# Patient Record
Sex: FEMALE | Race: WHITE | NOT HISPANIC OR LATINO | Employment: STUDENT | URBAN - METROPOLITAN AREA
[De-identification: names, ages, dates, MRNs, and addresses within clinical notes are randomized per-mention and may not be internally consistent; named-entity substitution may affect disease eponyms.]

---
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
## 2023-06-23 DIAGNOSIS — Z30.011 ENCOUNTER FOR INITIAL PRESCRIPTION OF CONTRACEPTIVE PILLS: ICD-10-CM

## 2023-06-23 RX ORDER — LEVONORGESTREL/ETHIN.ESTRADIOL 0.1-0.02MG
1 TABLET ORAL DAILY
Qty: 84 TABLET | Refills: 0 | Status: SHIPPED | OUTPATIENT
Start: 2023-06-23 | End: 2023-12-19

## 2023-11-25 ENCOUNTER — HEALTH MAINTENANCE LETTER (OUTPATIENT)
Age: 18
End: 2023-11-25

## 2023-12-18 DIAGNOSIS — Z30.011 ENCOUNTER FOR INITIAL PRESCRIPTION OF CONTRACEPTIVE PILLS: ICD-10-CM

## 2023-12-18 NOTE — LETTER
December 27, 2023      David Sandhu  [address] [address]        We received a refill request from your pharmacy for your LESSINA-28 0.1-20 MG-MCG tablet medication.  At this time the nurses were able to give you a david refill, but you are due to be seen for an office visit before the next refill.  This appointment can be scheduled by calling [phone] or can be scheduled via [employer] as well.    Thank you for choosing [facility]            Sincerely,          Melody Gomez, [facility]-BC

## 2023-12-19 RX ORDER — LEVONORGESTREL AND ETHINYL ESTRADIOL 0.1-0.02MG
KIT ORAL
Qty: 84 TABLET | Refills: 0 | Status: SHIPPED | OUTPATIENT
Start: 2023-12-19 | End: 2024-03-07

## 2024-03-06 SDOH — HEALTH STABILITY: PHYSICAL HEALTH: ON AVERAGE, HOW MANY DAYS PER WEEK DO YOU ENGAGE IN MODERATE TO STRENUOUS EXERCISE (LIKE A BRISK WALK)?: 3 DAYS

## 2024-03-06 SDOH — HEALTH STABILITY: PHYSICAL HEALTH: ON AVERAGE, HOW MANY MINUTES DO YOU ENGAGE IN EXERCISE AT THIS LEVEL?: 30 MIN

## 2024-03-06 ASSESSMENT — SOCIAL DETERMINANTS OF HEALTH (SDOH): HOW OFTEN DO YOU GET TOGETHER WITH FRIENDS OR RELATIVES?: THREE TIMES A WEEK

## 2024-03-06 NOTE — COMMUNITY RESOURCES LIST (ENGLISH)
03/06/2024   [facility]  N/A  For questions about this resource list or additional care needs, please contact your primary care clinic or care manager.  Phone: [phone]   Email: N/A   Address: [facility] [place] [address]   Hours: N/A        Hotlines and Helplines       Hotline - Housing crisis  1  [facility] (Main Office) Distance: 15.22 miles      Phone/Virtual   [address] [place] [address]  Language: English  Hours: Mon - Sun Open 24 Hours   Phone: (533) 151-5681 Email: info@[employer].Need Website: http://[employer].Need     2  [facility] Distance: 19.68 miles      Phone/Virtual   [address] [place] [address]  Language: English  Hours: Mon - Sun Open 24 Hours   Phone: (315) 448-3889 Email: info@[employer].Need Website: http://www.[employer].Need          Housing       Coordinated Entry access point  3  [facility] [place] ([facility] - Housing Services Distance: 20.42 miles      In-Person   [address] [place] [address]  Language: English  Hours: Mon - Fri 9:00 AM - 5:00 PM  Fees: Free   Phone: (247) 740-8322 Email: housing@[facility].org Website: http://www.[facility].org/housing     Drop-in center or day shelter  4  Counts include 234 beds at the [facility] Homeless Drop-In Resource Center Distance: 7.68 miles      In-Person   [address] [place] [address]  Language: English, Irish  Hours: Tue 2:00 PM - 5:00 PM , Thu 2:00 PM - 5:00 PM  Fees: Free   Phone: (413) 201-3399 Email: admin@lucierna.org Website: https://www.lucierna.org/     5  [facility] - Drop-in Center Distance: 16.02 miles      In-Person   [address] [address]  Language: English  Hours: Mon - Thu 2:30 PM - 5:00 PM  Fees: Free   Phone: (100) 523-8542 Email: info@movefwdmn.org Website: http://movefwdmn.org/     Housing search assistance  6  [facility] - [facility] hospitals in the [place] Case Management Distance: 7.68 miles      In-Person,  Phone/Virtual   [address] [place] [address]  Language: English, Jordanian  Hours: Tue 2:00 PM - 5:00 PM , Thu 2:00 PM - 5:00 PM  Fees: Free   Phone: (100) 932-8893 Email: admin@[employer] Website: https://www.[employer].Vir-Sec/     7  [facility] Distance: 7.96 miles      In-Person   [address] [place] [address]  Language: English  Hours: Mon - Fri 8:30 AM - 4:00 PM  Fees: Free   Phone: (881) 241-1378 Email: reception@[employer] Website: http://www.[facility].Vir-Sec     Shelter for families  8  [facility] - Families Moving Forward - [facility] Program Center - Families Moving Forward Distance: 8.29 miles      In-Person   [address] [place] [address]  Language: English  Hours: Mon - Fri 8:00 AM - 4:00 PM  Fees: Free   Phone: (348) 776-2299 Email: zia@[employer] Website: https://www.[employer].Vir-Sec/     9  [facility] - Families Moving Forward Distance: 22.57 miles      In-Person   [address] [place] [address]  Language: English  Hours: Mon - Fri 8:00 AM - 4:00 PM  Fees: Free   Phone: (297) 697-6587 Website: http://www.[employer].Vir-Sec     Shelter for individuals  10  [facility] ([facility]) [facility]  [facility] Distance: 6.45 miles      In-Person   [address] [place] [address]  Language: English, Jordanian  Hours: Mon - Fri 8:00 AM - 4:30 PM  Fees: Free   Phone: (942) 205-7853 Email: info@[facility].org Website: https://www.capagency.org/     11  [facility] - Emergency Shelter Program Distance: 7.68 miles      In-Person   [address] [place] [address]  Language: English, Jordanian  Hours: Tue 2:00 PM - 5:00 PM , Thu 2:00 PM - 5:00 PM  Fees: Free   Phone: (197) 609-3051 Email: admin@[employer].Vir-Sec Website: https://www.[employer].org/     Shelter for youth  12  [facility] and  [facility] - Emergency Youth Shelter Distance: 19.96 miles      In-Person   [address] [place] [address]  Language: English  Hours: Mon - Sun Open 24 Hours  Fees: Free   Phone: (463) 899-7363 Email: info@[employer].Plizy Website: https://www.[employer].org/locations/[place]/     13  [facility] - Emergency Youth Shelter Distance: 24.51 miles      In-Person   [address] [place] [address]  Language: English  Hours: Mon - Sun Open 24 Hours  Fees: Free   Phone: (583) 275-4756 Email: subha@[facility].salvationarmy.org Website: https://[facility].org/[facility]/          Important Numbers & Websites       Emergency Services   [address] [place] [address]  Poison Control   (879) 330-2860  Suicide Prevention Lifeline   (312) 874-4360 (TALK)  Child Abuse Hotline   (758) 113-4373 (4-A-Child)  Sexual Assault Hotline   (721) 328-9254 (HOPE)  National Runaway Safeline   (581) 688-1114 (RUNAWAY)  All-Options Talkline   (918) 152-2554  Substance Abuse Referral   (957) 699-4179 (HELP)

## 2024-03-07 ENCOUNTER — OFFICE VISIT (OUTPATIENT)
Dept: FAMILY MEDICINE | Facility: CLINIC | Age: 19
End: 2024-03-07
Payer: COMMERCIAL

## 2024-03-07 VITALS
WEIGHT: 186 LBS | HEIGHT: 61 IN | RESPIRATION RATE: 14 BRPM | DIASTOLIC BLOOD PRESSURE: 64 MMHG | SYSTOLIC BLOOD PRESSURE: 116 MMHG | OXYGEN SATURATION: 98 % | HEART RATE: 106 BPM | BODY MASS INDEX: 35.12 KG/M2 | TEMPERATURE: 98.6 F

## 2024-03-07 DIAGNOSIS — Z00.00 ROUTINE PHYSICAL EXAMINATION: Primary | ICD-10-CM

## 2024-03-07 DIAGNOSIS — Z30.011 ENCOUNTER FOR INITIAL PRESCRIPTION OF CONTRACEPTIVE PILLS: ICD-10-CM

## 2024-03-07 PROCEDURE — 99395 PREV VISIT EST AGE 18-39: CPT | Mod: 25 | Performed by: NURSE PRACTITIONER

## 2024-03-07 PROCEDURE — 90471 IMMUNIZATION ADMIN: CPT | Performed by: NURSE PRACTITIONER

## 2024-03-07 PROCEDURE — 90715 TDAP VACCINE 7 YRS/> IM: CPT | Performed by: NURSE PRACTITIONER

## 2024-03-07 RX ORDER — LEVONORGESTREL/ETHIN.ESTRADIOL 0.1-0.02MG
TABLET ORAL
Qty: 84 TABLET | Refills: 3 | Status: SHIPPED | OUTPATIENT
Start: 2024-03-07

## 2024-03-07 ASSESSMENT — ANXIETY QUESTIONNAIRES
1. FEELING NERVOUS, ANXIOUS, OR ON EDGE: SEVERAL DAYS
6. BECOMING EASILY ANNOYED OR IRRITABLE: NOT AT ALL
2. NOT BEING ABLE TO STOP OR CONTROL WORRYING: SEVERAL DAYS
GAD7 TOTAL SCORE: 3
GAD7 TOTAL SCORE: 3
IF YOU CHECKED OFF ANY PROBLEMS ON THIS QUESTIONNAIRE, HOW DIFFICULT HAVE THESE PROBLEMS MADE IT FOR YOU TO DO YOUR WORK, TAKE CARE OF THINGS AT HOME, OR GET ALONG WITH OTHER PEOPLE: NOT DIFFICULT AT ALL
7. FEELING AFRAID AS IF SOMETHING AWFUL MIGHT HAPPEN: NOT AT ALL
3. WORRYING TOO MUCH ABOUT DIFFERENT THINGS: SEVERAL DAYS
5. BEING SO RESTLESS THAT IT IS HARD TO SIT STILL: NOT AT ALL

## 2024-03-07 ASSESSMENT — PATIENT HEALTH QUESTIONNAIRE - PHQ9
SUM OF ALL RESPONSES TO PHQ QUESTIONS 1-9: 3
5. POOR APPETITE OR OVEREATING: NOT AT ALL

## 2024-03-07 ASSESSMENT — PAIN SCALES - GENERAL: PAINLEVEL: NO PAIN (0)

## 2024-03-07 NOTE — PROGRESS NOTES
"Preventive Care Visit  [facility]  Melody Gomez CNP, Nurse Practitioner - Family  Mar 7, 2024    Assessment & Plan     1. Routine physical examination    2. Encounter for initial prescription of contraceptive pills  - levonorgestrel-ethinyl estradiol (LESSINA-28) 0.1-20 MG-MCG tablet; TAKE 1 TABLET BY MOUTH DAILY. NEED APPOINTMENT  Dispense: 84 tablet; Refill: 3  Declined pap due to menses today. Will complete upon return.  Patient has been advised of split billing requirements and indicates understanding: Yes  Prescription drug management        BMI  Estimated body mass index is 35.14 kg/m  as calculated from the following:    Height as of this encounter: 1.549 m (5' 1\").    Weight as of this encounter: 84.4 kg (186 lb).   Weight management plan: Discussed healthy diet and exercise guidelines    Counseling  Appropriate preventive services were discussed with this patient, including applicable screening as appropriate for fall prevention, nutrition, physical activity, Tobacco-use cessation, weight loss and cognition.  Checklist reviewing preventive services available has been given to the patient.  Reviewed patient's diet, addressing concerns and/or questions.   She is at risk for lack of exercise and has been provided with information to increase physical activity for the benefit of her well-being.   She is at risk for psychosocial distress and has been provided with information to reduce risk.       See Patient Instructions    Leann Fink is a 19 year old, presenting for the following:  Physical        3/7/2024     9:22 AM   Additional Questions   Roomed by DELIA Maria   Accompanied by self      HPI      Depression and Anxiety Follow-Up  How are you doing with your depression since your last visit? Improved   How are you doing with your anxiety since your last visit?  Improved   Are you having other symptoms that might be associated with depression or anxiety? No  Have you had a " significant life event? No   Do you have any concerns with your use of alcohol or other drugs? No    Sees psychiatry for medications.     Declines pap today due to menses.     Social History     Tobacco Use    Smoking status: Never    Smokeless tobacco: Never   Vaping Use    Vaping Use: Never used   Substance Use Topics    Alcohol use: Never    Drug use: Never         3/7/2024    10:03 AM   PHQ   PHQ-9 Total Score 3   Q9: Thoughts of better off dead/self-harm past 2 weeks Not at all         3/7/2024    10:03 AM   CECILLE-7 SCORE   Total Score 3         3/7/2024    10:03 AM   Last PHQ-9   1.  Little interest or pleasure in doing things 0   2.  Feeling down, depressed, or hopeless 0   3.  Trouble falling or staying asleep, or sleeping too much 1   4.  Feeling tired or having little energy 1   5.  Poor appetite or overeating 0   6.  Feeling bad about yourself 0   7.  Trouble concentrating 1   8.  Moving slowly or restless 0   Q9: Thoughts of better off dead/self-harm past 2 weeks 0   PHQ-9 Total Score 3   Difficulty at work, home, or with people Not difficult at all       Suicide Assessment Five-step Evaluation and Treatment (SAFE-T)          3/6/2024   General Health   How would you rate your overall physical health? Good   Feel stress (tense, anxious, or unable to sleep) Only a little   (!) STRESS CONCERN      3/6/2024   Nutrition   Three or more servings of calcium each day? Yes   Diet: Regular (no restrictions)   How many servings of fruit and vegetables per day? (!) 2-3   How many sweetened beverages each day? 0-1         3/6/2024   Exercise   Days per week of moderate/strenous exercise 3 days   Average minutes spent exercising at this level 30 min         3/6/2024   Social Factors   Frequency of gathering with friends or relatives Three times a week   Worry food won't last until get money to buy more No   Food not last or not have enough money for food? No   Do you have housing?  No   Are you worried about losing  "your housing? No   Lack of transportation? No   Unable to get utilities (heat,electricity)? No   Want help with housing or utility concern? No   (!) HOUSING CONCERN PRESENT      3/6/2024   Dental   Dentist two times every year? Yes         3/6/2024   TB Screening   Were you born outside of [place]?  No         Today's PHQ-2 Score:       3/6/2024     7:24 AM   PHQ-2 ( 1999 Pfizer)   Q1: Little interest or pleasure in doing things 0   Q2: Feeling down, depressed or hopeless 0   PHQ-2 Score 0   Q1: Little interest or pleasure in doing things Not at all   Q2: Feeling down, depressed or hopeless Not at all   PHQ-2 Score 0           3/6/2024   Substance Use   Alcohol more than 3/day or more than 7/wk No   Do you use any other substances recreationally? No     Social History     Tobacco Use    Smoking status: Never    Smokeless tobacco: Never   Vaping Use    Vaping Use: Never used   Substance Use Topics    Alcohol use: Never    Drug use: Never             3/6/2024   One time HIV Screening   Previous HIV test? No         3/6/2024   STI Screening   New sexual partner(s) since last STI/HIV test? No     History of abnormal Pap smear: NO - age 21-29 PAP every 3 years recommended             3/6/2024   Contraception/Family Planning   Questions about contraception or family planning No        Reviewed and updated as needed this visit by Provider                    Lab work is in process      Review of Systems  Constitutional, HEENT, cardiovascular, pulmonary, GI, , musculoskeletal, neuro, skin, endocrine and psych systems are negative, except as otherwise noted.     Objective    Exam  /64   Pulse 106   Temp 98.6  F (37  C) (Tympanic)   Resp 14   Ht 1.549 m (5' 1\")   Wt 84.4 kg (186 lb)   LMP 02/28/2024 (Approximate)   SpO2 98%   BMI 35.14 kg/m     Estimated body mass index is 35.14 kg/m  as calculated from the following:    Height as of this encounter: 1.549 m (5' 1\").    Weight as of this encounter: 84.4 kg (186 " lb).    Physical Exam  GENERAL: alert and no distress  EYES: Eyes grossly normal to inspection, PERRL and conjunctivae and sclerae normal  HENT: ear canals and TM's normal, nose and mouth without ulcers or lesions  NECK: no adenopathy, no asymmetry, masses, or scars  RESP: lungs clear to auscultation - no rales, rhonchi or wheezes  CV: regular rate and rhythm, normal S1 S2, no S3 or S4, no murmur, click or rub, no peripheral edema  ABDOMEN: soft, nontender, no hepatosplenomegaly, no masses and bowel sounds normal  MS: no gross musculoskeletal defects noted, no edema  SKIN: no suspicious lesions or rashes  NEURO: Normal strength and tone, mentation intact and speech normal  PSYCH: mentation appears normal, affect normal/bright      Signed Electronically by: Melody Gomez, CNP